# Patient Record
Sex: MALE | Race: WHITE | Employment: FULL TIME | URBAN - METROPOLITAN AREA
[De-identification: names, ages, dates, MRNs, and addresses within clinical notes are randomized per-mention and may not be internally consistent; named-entity substitution may affect disease eponyms.]

---

## 2018-02-20 ENCOUNTER — HOSPITAL ENCOUNTER (EMERGENCY)
Facility: CLINIC | Age: 48
Discharge: HOME | End: 2018-02-21
Attending: EMERGENCY MEDICINE | Admitting: EMERGENCY MEDICINE
Payer: COMMERCIAL

## 2018-02-20 ENCOUNTER — APPOINTMENT (OUTPATIENT)
Dept: GENERAL RADIOLOGY | Facility: CLINIC | Age: 48
End: 2018-02-20
Attending: EMERGENCY MEDICINE
Payer: COMMERCIAL

## 2018-02-20 DIAGNOSIS — J10.1 INFLUENZA A: ICD-10-CM

## 2018-02-20 DIAGNOSIS — I10 BENIGN ESSENTIAL HYPERTENSION: ICD-10-CM

## 2018-02-20 LAB
FLUAV+FLUBV AG SPEC QL: NEGATIVE
FLUAV+FLUBV AG SPEC QL: POSITIVE
SPECIMEN SOURCE: ABNORMAL

## 2018-02-20 PROCEDURE — 87804 INFLUENZA ASSAY W/OPTIC: CPT | Mod: 91 | Performed by: EMERGENCY MEDICINE

## 2018-02-20 PROCEDURE — 25000132 ZZH RX MED GY IP 250 OP 250 PS 637: Performed by: EMERGENCY MEDICINE

## 2018-02-20 PROCEDURE — 99284 EMERGENCY DEPT VISIT MOD MDM: CPT | Mod: 25

## 2018-02-20 PROCEDURE — 71046 X-RAY EXAM CHEST 2 VIEWS: CPT

## 2018-02-20 RX ORDER — IBUPROFEN 600 MG/1
600 TABLET, FILM COATED ORAL ONCE
Status: COMPLETED | OUTPATIENT
Start: 2018-02-20 | End: 2018-02-20

## 2018-02-20 RX ADMIN — IBUPROFEN 600 MG: 600 TABLET ORAL at 22:15

## 2018-02-20 ASSESSMENT — ENCOUNTER SYMPTOMS
COUGH: 1
DIZZINESS: 0
NAUSEA: 0
NUMBNESS: 0
CHILLS: 1
DIARRHEA: 0
SINUS PRESSURE: 1
FEVER: 0
HEADACHES: 1
VOMITING: 0

## 2018-02-20 NOTE — ED AVS SNAPSHOT
[facility] Emergency Department    [address]    [facility] [address]    Phone:  [phone]    Fax:  [phone]                                       Mendoza Meyer   MRN: [id number]    Department:  [facility] Emergency Department   Date of Visit:  2/20/2018           After Visit Summary Signature Page     I have received my discharge instructions, and my questions have been answered. I have discussed any challenges I see with this plan with the nurse or doctor.    ..........................................................................................................................................  Patient/Patient Representative Signature      ..........................................................................................................................................  Patient Representative Print Name and Relationship to Patient    ..................................................               ................................................  Date                                            Time    ..........................................................................................................................................  Reviewed by Signature/Title    ...................................................              ..............................................  Date                                                            Time

## 2018-02-20 NOTE — LETTER
February 21, 2018      To Whom It May Concern:      Mendoza Meyer was seen in our Emergency Department today, 02/21/18.  I expect his condition to improve over the next 3 days.  He may return to work/school on 2/25/18.    Sincerely,        Comfort Estrada RN

## 2018-02-20 NOTE — ED AVS SNAPSHOT
[facility] Emergency Department    [address] [place] [address]    Phone:  [phone]    Fax:  [phone]                                       Mendoza Meyer   MRN: [id number]    Department:  [facility] Emergency Department   Date of Visit:  2/20/2018           Patient Information     Date Of Birth          1970        Your diagnoses for this visit were:     Influenza A     Benign essential hypertension        You were seen by Erin Livingston MD.      Follow-up Information     Follow up with [facility], [place]. Schedule an appointment as soon as possible for a visit in 3 days.    Specialty:  Family Practice        Discharge Instructions       Get extra rest and drink extra clear liquids. You may take acetaminophen or ibuprofen according to package directions, with food, for fevers/aches.     See your primary care clinic for recheck in 3-5 days if symptoms persist and for recheck of your blood pressure, or sooner if symptoms are not improving.    If you have any worsening/severe symptoms seek medical care right away.         The Flu (Influenza)     The virus that causes the flu spreads through the air in droplets when someone who has the flu coughs, sneezes, laughs, or talks.   The flu (influenza) is an infection that affects your respiratory tract. This tract is made up of your mouth, nose, and lungs, and the passages between them. Unlike a cold, the flu can make you very ill. And it can lead to pneumonia, a serious lung infection. The flu can have serious complications and even cause death.  Who is at risk for the flu?  Anyone can get the flu. But you are more likely to become infected if you:    Have a weakened immune system    Work in a healthcare setting where you may be exposed to flu germs    Live or work with someone who has the flu    Haven t had an annual flu shot  How does the flu spread?  The flu is caused by a virus. The virus spreads through  the air in droplets when someone who has the flu coughs, sneezes, laughs, or talks. You can become infected when you inhale these viruses directly. You can also become infected when you touch a surface on which the droplets have landed and then transfer the germs to your eyes, nose, or mouth. Touching used tissues, or sharing utensils, drinking glasses, or a toothbrush from an infected person can expose you to flu viruses, too.  What are the symptoms of the flu?  Flu symptoms tend to come on quickly and may last a few days to a few weeks. They include:    Fever usually higher than 100.4 F  (38 C) and chills    Sore throat and headache    Dry cough    Runny nose    Tiredness and weakness    Muscle aches  Who is at risk for flu complications?  For some people, the flu can be very serious. The risk for complications is greater for:    Children younger than age 5    Adults ages 65 and older    People with a chronic illness such as diabetes or heart, kidney, or lung disease    People who live in a nursing home or long-term care facility   How is the flu treated?  The flu usually gets better after 7 days or so. In some cases, your healthcare provider may prescribe an antiviral medicine. This may help you get well a little sooner. For the medicine to help, you need to take it as soon as possible (ideally within 48 hours) after your symptoms start. If you develop pneumonia or other serious illness, you may need to stay in the hospital.  Easing flu symptoms    Drink lots of fluids such as water, juice, and warm soup. A good rule is to drink enough so that you urinate your normal amount.    Get plenty of rest.    Ask your healthcare provider what to take for fever and pain.    Call your provider if your fever is 100.4 F (38 C) or higher, or you become dizzy, lightheaded, or short of breath.  Taking steps to protect others    Wash your hands often, especially after coughing or sneezing. Or clean your hands with an  alcohol-based hand  containing at least 60% alcohol.    Cough or sneeze into a tissue. Then throw the tissue away and wash your hands. If you don t have a tissue, cough and sneeze into your elbow.    Stay home until at least 24 hours after you no longer have a fever or chills. Be sure the fever isn t being hidden by fever-reducing medicine.    Don t share food, utensils, drinking glasses, or a toothbrush with others.    Ask your healthcare provider if others in your household should get antiviral medicine to help them avoid infection.  How can the flu be prevented?    One of the best ways to avoid the flu is to get a flu vaccine each year. The virus that causes the flu changes from year to year. For that reason, healthcare providers recommend getting the flu vaccine each year, as soon as it's available in your area. The vaccine is given as a shot. Your healthcare provider can tell you which vaccine is right for you. A nasal spray is also available but is not recommended for the 9375-0960 flu season. The CDC says this is because the nasal spray did not seem to protect against the flu over the last several flu seasons. In the past, it was meant for people ages 2 to 49.    Wash your hands often. Frequent handwashing is a proven way to help prevent infection.    Carry an alcohol-based hand gel containing at least 60% alcohol. Use it when you can't use soap and water. Then wash your hands as soon as you can.    Avoid touching your eyes, nose, and mouth.    At home and work, clean phones, computer keyboards, and toys often with disinfectant wipes.    If possible, avoid close contact with others who have the flu or symptoms of the flu.  Handwashing tips  Handwashing is one of the best ways to prevent many common infections. If you are caring for or visiting someone with the flu, wash your hands each time you enter and leave the room. Follow these steps:    Use warm water and plenty of soap. Rub your hands together  well.    Clean the whole hand, including under your nails, between your fingers, and up the wrists.    Wash for at least 15 seconds.    Rinse, letting the water run down your fingers, not up your wrists.    Dry your hands well. Use a paper towel to turn off the faucet and open the door.  Using alcohol-based hand   Alcohol-based hand  are also a good choice. Use them when you can't use soap and water. Follow these steps:    Squeeze about a tablespoon of gel into the palm of one hand.    Rub your hands together briskly, cleaning the backs of your hands, the palms, between your fingers, and up the wrists.    Rub until the gel is gone and your hands are completely dry.  Preventing the flu in healthcare settings  The flu is a special concern for people in hospitals and long-term care facilities. To help prevent the spread of flu, many hospitals and nursing homes take these steps:    Healthcare providers wash their hands or use an alcohol-based hand  before and after treating each patient.    People with the flu have private rooms and bathrooms or share a room with someone with the same infection.    People who are at high risk for the flu but don't have it are encouraged to get the flu and pneumonia vaccines.    All healthcare workers are encouraged or required to get flu shots.   Date Last Reviewed: 12/1/2016 2000-2017 [employer]. [address] [place] [address]. All rights reserved. This information is not intended as a substitute for professional medical care. Always follow your healthcare professional's instructions.        Established High Blood Pressure    High blood pressure (hypertension) is a chronic disease. Often, healthcare providers don t know what causes it. But it can be caused by certain health conditions and medicines.  If you have high blood pressure, you may not have any symptoms. If you do have symptoms, they may include headache, dizziness, changes  in your vision, chest pain, and shortness of breath. But even without symptoms, high blood pressure that s not treated raises your risk for heart attack and stroke. High blood pressure is a serious health risk and shouldn t be ignored.  A blood pressure reading is made up of two numbers: a higher number over a lower number. The top number is the systolic pressure. The bottom number is the diastolic pressure. A normal blood pressure is a systolic pressure of  less than 120 over a diastolic pressure of less than 80. You will see your blood pressure readings written together. For example, a person with a systolic pressure of 188 and a diastolic pressure of 78 will have 118/78 written in the medical record.  High blood pressure is when either the top number is 140 or higher, or the bottom number is 90 or higher. This must be the result when taking your blood pressure a number of times. The blood pressures between normal and high are called prehypertension.  Home care  If you have high blood pressure, you should do what is listed below to lower your blood pressure. If you are taking medicines for high blood pressure, these methods may reduce or end your need for medicines in the future.  Begin a weight-loss program if you are overweight.  Cut back on how much salt you get in your diet. Here s how to do this:  Don t eat foods that have a lot of salt. These include olives, pickles, smoked meats, and salted potato chips.  Don t add salt to your food at the table.  Use only small amounts of salt when cooking.  Start an exercise program. Talk with your healthcare provider about the type of exercise program that would be best for you. It doesn't have to be hard. Even brisk walking for 20 minutes 3 times a week is a good form of exercise.  Don t take medicines that stimulate the heart. This includes many over-the-counter cold and sinus decongestant pills and sprays, as well as diet pills. Check the warnings about hypertension  on the label. Before buying any over-the-counter medicines or supplements, always ask the pharmacist about the product's potential interaction with your high blood pressure and your high blood pressure medicines.  Stimulants such as amphetamine or cocaine could be deadly for someone with high blood pressure. Never take these.  Limit how much caffeine you get in your diet. Switch to caffeine-free products.  Stop smoking. If you are a long-time smoker, this can be hard. Talk to your healthcare provider about medicines and nicotine replacement options to help you. Also, enroll in a stop-smoking program to make it more likely that you will quit for good.  Learn how to handle stress. This is an important part of any program to lower blood pressure. Learn about relaxation methods like meditation, yoga, or biofeedback.  If your provider prescribed medicines, take them exactly as directed. Missing doses may cause your blood pressure get out of control.  If you miss a dose or doses, check with your healthcare provider or pharmacist about what to do.  Consider buying an automatic blood pressure machine. Ask your provider for a recommendation. You can get one of these at most pharmacies.     The American Heart Association recommends the following guidelines for home blood pressure monitoring:  Don't smoke or drink coffee for 30 minutes before taking your blood pressure.  Go to the bathroom before the test.  Relax for 5 minutes before taking the measurement.  Sit with your back supported (don't sit on a couch or soft chair); keep your feet on the floor uncrossed. Place your arm on a solid flat surface (like a table) with the upper part of the arm at heart level. Place the middle of the cuff directly above the eye of the elbow. Check the monitor's instruction manual for an illustration.  Take multiple readings. When you measure, take 2 to 3 readings one minute apart and record all of the results.  Take your blood pressure at the  same time every day, or as your healthcare provider recommends.  Record the date, time, and blood pressure reading.  Take the record with you to your next medical appointment. If your blood pressure monitor has a built-in memory, simply take the monitor with you to your next appointment.  Call your provider if you have several high readings. Don't be frightened by a single high blood pressure reading, but if you get several high readings, check in with your healthcare provider.  Note: When blood pressure reaches a systolic (top number) of 180 or higher OR diastolic (bottom number) of 110 or higher, seek emergency medical treatment.  Follow-up care  You will need to see your healthcare provider regularly. This is to check your blood pressure and to make changes to your medicines. Make a follow-up appointment as directed. Bring the record of your home blood pressure readings to the appointment.  When to seek medical advice  Call your healthcare provider right away if any of these occur:  Blood pressure reaches a systolic (upper number) of 180 or higher OR a diastolic (bottom number) of 110 or higher  Chest pain or shortness of breath  Severe headache  Throbbing or rushing sound in the ears  Nosebleed  Sudden severe pain in your belly (abdomen)  Extreme drowsiness, confusion, or fainting  Dizziness or spinning sensation (vertigo)  Weakness of an arm or leg or one side of the face  You have problems speaking or seeing   Date Last Reviewed: 12/1/2016 2000-2017 [employer]. [address] [place] [address]. All rights reserved. This information is not intended as a substitute for professional medical care. Always follow your healthcare professional's instructions.          24 Hour Appointment Hotline       To make an appointment at any [facility] call [phone] ([phone]). If you don't have a family doctor or clinic, we will help you find one. [facility] are conveniently  located to serve the needs of you and your family.             Review of your medicines      Our records show that you are taking the medicines listed below. If these are incorrect, please call your family doctor or clinic.        Dose / Directions Last dose taken    BENAZEPRIL HCL PO        Refills:  0                Procedures and tests performed during your visit     Influenza A/B antigen    XR Chest 2 Views      Orders Needing Specimen Collection     None      Pending Results     No orders found for last 3 day(s).            Pending Culture Results     No orders found for last 3 day(s).            Pending Results Instructions     If you had any lab results that were not finalized at the time of your Discharge, you can call the ED Lab Result RN at [phone]. You will be contacted by this team for any positive Lab results or changes in treatment. The nurses are available 7 days a week from 10A to 6:30P.  You can leave a message 24 hours per day and they will return your call.        Test Results From Your Hospital Stay        2/20/2018  9:15 PM      Component Results     Component Value Ref Range & Units Status    Influenza A/B Agn Specimen Nasal  Final    Influenza A Positive (A) NEG^Negative Final    Influenza B Negative NEG^Negative Final    Test results must be correlated with clinical data. If necessary, results   should be confirmed by a molecular assay or viral culture.           2/20/2018 10:42 PM      Narrative     XR CHEST 2 VW 2/20/2018 10:32 PM    COMPARISON: None.    HISTORY: Cough.        Impression     IMPRESSION: Cardiac silhouette and pulmonary vasculature are within  normal limits. No focal airspace disease, pleural effusion or  pneumothorax.    TARYN SHANKAR MD                Clinical Quality Measure: Blood Pressure Screening     Your blood pressure was checked while you were in the emergency department today. The last reading we obtained was  BP: 160/90 . Please read the guidelines below  "about what these numbers mean and what you should do about them.  If your systolic blood pressure (the top number) is less than 120 and your diastolic blood pressure (the bottom number) is less than 80, then your blood pressure is normal. There is nothing more that you need to do about it.  If your systolic blood pressure (the top number) is 120-139 or your diastolic blood pressure (the bottom number) is 80-89, your blood pressure may be higher than it should be. You should have your blood pressure rechecked within a year by a primary care provider.  If your systolic blood pressure (the top number) is 140 or greater or your diastolic blood pressure (the bottom number) is 90 or greater, you may have high blood pressure. High blood pressure is treatable, but if left untreated over time it can put you at risk for heart attack, stroke, or kidney failure. You should have your blood pressure rechecked by a primary care provider within the next 4 weeks.  If your provider in the emergency department today gave you specific instructions to follow-up with your doctor or provider even sooner than that, you should follow that instruction and not wait for up to 4 weeks for your follow-up visit.        Thank you for choosing [facility]       Thank you for choosing [facility] for your care. Our goal is always to provide you with excellent care. Hearing back from our patients is one way we can continue to improve our services. Please take a few minutes to complete the written survey that you may receive in the mail after you visit with us. Thank you!        [employer] Information     [employer] lets you send messages to your doctor, view your test results, renew your prescriptions, schedule appointments and more. To sign up, go to [email or web address] . Click on \"Log in\" on the left side of the screen, which will take you to the Welcome page. Then click on \"Sign up Now\" on the right side of the page.     You will be asked to enter the " access code listed below, as well as some personal information. Please follow the directions to create your username and password.     Your access code is: [email or web address]  Expires: 2018 12:19 AM     Your access code will  in 90 days. If you need help or a new code, please call your [place] clinic or [phone].        Care EveryWhere ID     This is your Care EveryWhere ID. This could be used by other organizations to access your [facility] medical records  [id number]        Equal Access to Services     [facility] : [place] winters Sodave, waaxlesley luqadaha, qaybonel kaalmalesley lombardi, silverio junior . So [facility] [phone].    ATENCIÓN: Si habla español, tiene a robles disposición servicios gratuitos de asistencia lingüística. Llame al [phone].    We comply with applicable federal civil rights laws and [place] laws. We do not discriminate on the basis of race, color, national origin, age, disability, sex, sexual orientation, or gender identity.            After Visit Summary       This is your record. Keep this with you and show to your community pharmacist(s) and doctor(s) at your next visit.

## 2018-02-21 VITALS
SYSTOLIC BLOOD PRESSURE: 160 MMHG | WEIGHT: 285 LBS | TEMPERATURE: 100.2 F | OXYGEN SATURATION: 98 % | RESPIRATION RATE: 18 BRPM | DIASTOLIC BLOOD PRESSURE: 90 MMHG

## 2018-02-21 NOTE — DISCHARGE INSTRUCTIONS
Get extra rest and drink extra clear liquids. You may take acetaminophen or ibuprofen according to package directions, with food, for fevers/aches.     See your primary care clinic for recheck in 3-5 days if symptoms persist and for recheck of your blood pressure, or sooner if symptoms are not improving.    If you have any worsening/severe symptoms seek medical care right away.         The Flu (Influenza)     The virus that causes the flu spreads through the air in droplets when someone who has the flu coughs, sneezes, laughs, or talks.   The flu (influenza) is an infection that affects your respiratory tract. This tract is made up of your mouth, nose, and lungs, and the passages between them. Unlike a cold, the flu can make you very ill. And it can lead to pneumonia, a serious lung infection. The flu can have serious complications and even cause death.  Who is at risk for the flu?  Anyone can get the flu. But you are more likely to become infected if you:    Have a weakened immune system    Work in a healthcare setting where you may be exposed to flu germs    Live or work with someone who has the flu    Haven t had an annual flu shot  How does the flu spread?  The flu is caused by a virus. The virus spreads through the air in droplets when someone who has the flu coughs, sneezes, laughs, or talks. You can become infected when you inhale these viruses directly. You can also become infected when you touch a surface on which the droplets have landed and then transfer the germs to your eyes, nose, or mouth. Touching used tissues, or sharing utensils, drinking glasses, or a toothbrush from an infected person can expose you to flu viruses, too.  What are the symptoms of the flu?  Flu symptoms tend to come on quickly and may last a few days to a few weeks. They include:    Fever usually higher than 100.4 F  (38 C) and chills    Sore throat and headache    Dry cough    Runny nose    Tiredness and weakness    Muscle  aches  Who is at risk for flu complications?  For some people, the flu can be very serious. The risk for complications is greater for:    Children younger than age 5    Adults ages 65 and older    People with a chronic illness such as diabetes or heart, kidney, or lung disease    People who live in a nursing home or long-term care facility   How is the flu treated?  The flu usually gets better after 7 days or so. In some cases, your healthcare provider may prescribe an antiviral medicine. This may help you get well a little sooner. For the medicine to help, you need to take it as soon as possible (ideally within 48 hours) after your symptoms start. If you develop pneumonia or other serious illness, you may need to stay in the hospital.  Easing flu symptoms    Drink lots of fluids such as water, juice, and warm soup. A good rule is to drink enough so that you urinate your normal amount.    Get plenty of rest.    Ask your healthcare provider what to take for fever and pain.    Call your provider if your fever is 100.4 F (38 C) or higher, or you become dizzy, lightheaded, or short of breath.  Taking steps to protect others    Wash your hands often, especially after coughing or sneezing. Or clean your hands with an alcohol-based hand  containing at least 60% alcohol.    Cough or sneeze into a tissue. Then throw the tissue away and wash your hands. If you don t have a tissue, cough and sneeze into your elbow.    Stay home until at least 24 hours after you no longer have a fever or chills. Be sure the fever isn t being hidden by fever-reducing medicine.    Don t share food, utensils, drinking glasses, or a toothbrush with others.    Ask your healthcare provider if others in your household should get antiviral medicine to help them avoid infection.  How can the flu be prevented?    One of the best ways to avoid the flu is to get a flu vaccine each year. The virus that causes the flu changes from year to year. For  that reason, healthcare providers recommend getting the flu vaccine each year, as soon as it's available in your area. The vaccine is given as a shot. Your healthcare provider can tell you which vaccine is right for you. A nasal spray is also available but is not recommended for the 2164-8893 flu season. The CDC says this is because the nasal spray did not seem to protect against the flu over the last several flu seasons. In the past, it was meant for people ages 2 to 49.    Wash your hands often. Frequent handwashing is a proven way to help prevent infection.    Carry an alcohol-based hand gel containing at least 60% alcohol. Use it when you can't use soap and water. Then wash your hands as soon as you can.    Avoid touching your eyes, nose, and mouth.    At home and work, clean phones, computer keyboards, and toys often with disinfectant wipes.    If possible, avoid close contact with others who have the flu or symptoms of the flu.  Handwashing tips  Handwashing is one of the best ways to prevent many common infections. If you are caring for or visiting someone with the flu, wash your hands each time you enter and leave the room. Follow these steps:    Use warm water and plenty of soap. Rub your hands together well.    Clean the whole hand, including under your nails, between your fingers, and up the wrists.    Wash for at least 15 seconds.    Rinse, letting the water run down your fingers, not up your wrists.    Dry your hands well. Use a paper towel to turn off the faucet and open the door.  Using alcohol-based hand   Alcohol-based hand  are also a good choice. Use them when you can't use soap and water. Follow these steps:    Squeeze about a tablespoon of gel into the palm of one hand.    Rub your hands together briskly, cleaning the backs of your hands, the palms, between your fingers, and up the wrists.    Rub until the gel is gone and your hands are completely dry.  Preventing the flu in  healthcare settings  The flu is a special concern for people in hospitals and long-term care facilities. To help prevent the spread of flu, many hospitals and nursing homes take these steps:    Healthcare providers wash their hands or use an alcohol-based hand  before and after treating each patient.    People with the flu have private rooms and bathrooms or share a room with someone with the same infection.    People who are at high risk for the flu but don't have it are encouraged to get the flu and pneumonia vaccines.    All healthcare workers are encouraged or required to get flu shots.   Date Last Reviewed: 12/1/2016 2000-2017 [employer]. [address] [place] [address], [place] [address]. All rights reserved. This information is not intended as a substitute for professional medical care. Always follow your healthcare professional's instructions.        Established High Blood Pressure    High blood pressure (hypertension) is a chronic disease. Often, healthcare providers don t know what causes it. But it can be caused by certain health conditions and medicines.  If you have high blood pressure, you may not have any symptoms. If you do have symptoms, they may include headache, dizziness, changes in your vision, chest pain, and shortness of breath. But even without symptoms, high blood pressure that s not treated raises your risk for heart attack and stroke. High blood pressure is a serious health risk and shouldn t be ignored.  A blood pressure reading is made up of two numbers: a higher number over a lower number. The top number is the systolic pressure. The bottom number is the diastolic pressure. A normal blood pressure is a systolic pressure of  less than 120 over a diastolic pressure of less than 80. You will see your blood pressure readings written together. For example, a person with a systolic pressure of 188 and a diastolic pressure of 78 will have 118/78 written in the medical  record.  High blood pressure is when either the top number is 140 or higher, or the bottom number is 90 or higher. This must be the result when taking your blood pressure a number of times. The blood pressures between normal and high are called prehypertension.  Home care  If you have high blood pressure, you should do what is listed below to lower your blood pressure. If you are taking medicines for high blood pressure, these methods may reduce or end your need for medicines in the future.  Begin a weight-loss program if you are overweight.  Cut back on how much salt you get in your diet. Here s how to do this:  Don t eat foods that have a lot of salt. These include olives, pickles, smoked meats, and salted potato chips.  Don t add salt to your food at the table.  Use only small amounts of salt when cooking.  Start an exercise program. Talk with your healthcare provider about the type of exercise program that would be best for you. It doesn't have to be hard. Even brisk walking for 20 minutes 3 times a week is a good form of exercise.  Don t take medicines that stimulate the heart. This includes many over-the-counter cold and sinus decongestant pills and sprays, as well as diet pills. Check the warnings about hypertension on the label. Before buying any over-the-counter medicines or supplements, always ask the pharmacist about the product's potential interaction with your high blood pressure and your high blood pressure medicines.  Stimulants such as amphetamine or cocaine could be deadly for someone with high blood pressure. Never take these.  Limit how much caffeine you get in your diet. Switch to caffeine-free products.  Stop smoking. If you are a long-time smoker, this can be hard. Talk to your healthcare provider about medicines and nicotine replacement options to help you. Also, enroll in a stop-smoking program to make it more likely that you will quit for good.  Learn how to handle stress. This is an  important part of any program to lower blood pressure. Learn about relaxation methods like meditation, yoga, or biofeedback.  If your provider prescribed medicines, take them exactly as directed. Missing doses may cause your blood pressure get out of control.  If you miss a dose or doses, check with your healthcare provider or pharmacist about what to do.  Consider buying an automatic blood pressure machine. Ask your provider for a recommendation. You can get one of these at most pharmacies.     The American Heart Association recommends the following guidelines for home blood pressure monitoring:  Don't smoke or drink coffee for 30 minutes before taking your blood pressure.  Go to the bathroom before the test.  Relax for 5 minutes before taking the measurement.  Sit with your back supported (don't sit on a couch or soft chair); keep your feet on the floor uncrossed. Place your arm on a solid flat surface (like a table) with the upper part of the arm at heart level. Place the middle of the cuff directly above the eye of the elbow. Check the monitor's instruction manual for an illustration.  Take multiple readings. When you measure, take 2 to 3 readings one minute apart and record all of the results.  Take your blood pressure at the same time every day, or as your healthcare provider recommends.  Record the date, time, and blood pressure reading.  Take the record with you to your next medical appointment. If your blood pressure monitor has a built-in memory, simply take the monitor with you to your next appointment.  Call your provider if you have several high readings. Don't be frightened by a single high blood pressure reading, but if you get several high readings, check in with your healthcare provider.  Note: When blood pressure reaches a systolic (top number) of 180 or higher OR diastolic (bottom number) of 110 or higher, seek emergency medical treatment.  Follow-up care  You will need to see your healthcare  provider regularly. This is to check your blood pressure and to make changes to your medicines. Make a follow-up appointment as directed. Bring the record of your home blood pressure readings to the appointment.  When to seek medical advice  Call your healthcare provider right away if any of these occur:  Blood pressure reaches a systolic (upper number) of 180 or higher OR a diastolic (bottom number) of 110 or higher  Chest pain or shortness of breath  Severe headache  Throbbing or rushing sound in the ears  Nosebleed  Sudden severe pain in your belly (abdomen)  Extreme drowsiness, confusion, or fainting  Dizziness or spinning sensation (vertigo)  Weakness of an arm or leg or one side of the face  You have problems speaking or seeing   Date Last Reviewed: 12/1/2016 2000-2017 [employer]. [address] [place] [address], [place] [address]. All rights reserved. This information is not intended as a substitute for professional medical care. Always follow your healthcare professional's instructions.

## 2018-02-21 NOTE — ED PROVIDER NOTES
History     Chief Complaint:  Flu Symptoms and Hypertension    HPI   Mendoza Meyer is a 47 year old male with a history of hypertension who presents with flu symptoms and a headache. The patient reports developing nasal congestion, cough, and chills without fever 4 days ago. He began to feel better the next day until around 1600 today when he developed worsening cough, congestion, mild nasal congestion, and chills. He took Mucinex night time for his symptoms (containing phenylephrine) and developed a headache with sinus pressure. He checked his blood pressure at home and found it to be 173/112, so he presents for further evaluation and treatment. He notes no numbness or vision changes.  He denies nausea, vomiting, diarrhea, or dizziness. He endorses drinking fluids in excess recently. Of note, the patient reports taking his Benazepril as prescribed and his pressures have been running around 125/75 at baseline.     Allergies:  No known drug allergies      Medications:    Benazepril     Past Medical History:    Hypertension    Past Surgical History:    Orthopedic surgery    Family History:    History review. No contributing family history.     Social History:  Smoking status: No  Alcohol use: yes   PCP: No primary care provider on file.   Patient presents alone.   Marital Status:       Review of Systems   Constitutional: Positive for chills. Negative for fever.   HENT: Positive for congestion, postnasal drip and sinus pressure.    Eyes: Negative for visual disturbance.   Respiratory: Positive for cough.    Gastrointestinal: Negative for diarrhea, nausea and vomiting.   Neurological: Positive for headaches. Negative for dizziness and numbness.   All other systems reviewed and are negative.    Physical Exam     Patient Vitals for the past 24 hrs:   BP Temp Temp src Heart Rate Resp SpO2 Weight   02/20/18 2343 160/90 - - - - - -   02/20/18 2317 (!) 163/98 - - - - - -   02/20/18 2032 (!) 168/104 100.2  F (37.9   C) Oral 107 18 98 % 129.3 kg (285 lb)     Physical Exam  Constitutional:  Well developed, Well nourished, completely comfortable appearing   HENT:  Bilateral external ears normal, Mucous membranes moist, Nose normal. Neck- Normal range of motion, Supple. Oropharynx clear, TMs unremarkable, sinuses non tender  Respiratory:  Normal breath sounds, No respiratory distress, No wheezing,  Cardiovascular:  Normal heart rate, Normal rhythm, No murmurs,    GI:  Bowel sounds normal, Soft, No tenderness,   Musculoskeletal:  Intact distal pulses, No edema, grossly unremarkable range of motion   Integument:  Warm, Dry   Neurologic:  Alert, attentive and appropriately oriented  Psychiatric:  Mildly anxious    Emergency Department Course     Imaging:  Radiographic findings were communicated with the patient who voiced understanding of the findings.    X-ray Chest, 2 views:  Cardiac silhouette and pulmonary vasculature are within  normal limits. No focal airspace disease, pleural effusion or  pneumothorax.   As read by Radiology.     Laboratory:  Influenza: Positive A    Interventions:  2215: Ibuprofen 600 mg PO    Emergency Department Course:  Past medical records, nursing notes, and vitals reviewed.  2159: I performed an exam of the patient and obtained history, as documented above. GCS 15.  The patient was taken for xray, see imaging results above.    0014: I rechecked the patient. Patient is feeling improved and reports cleat resolution of his headache. Findings and plan explained to the Patient. Patient discharged home with instructions regarding supportive care, medications, and reasons to return. The importance of close follow-up was reviewed.      Impression & Plan      Medical Decision Making:  Mendoza Meyer is a 47 year old male who presents for evaluation of cough, fever and myalgias.  They have other symptoms including headache and chills.   This is consistent with influenza and patient was influenza positive with  testing done at triage.   The patient is out of treatment window for occasion and is not in a high risk population likely to benefit from medications.  They are at risk for pneumonia, and given his description of initial improving and now worsening symptoms with a low-grade fever here but this did not show any evidence of pneumonia, chest x-ray was done, .  Close followup of primary care physician is indicated and return to the ED for high fevers > 103 for more than 48 hours more, increasing productive cough, shortness of breath, or confusion.  There is no signs of serious bacterial infection such as bacteremia, meningitis, UTI/pyelonephritis, strep pharyngitis, etc. blood pressure was elevated as noted, in the context of headache pain and fever.  Blood pressure improved without intervention with treatment of his fever.  It was higher than baseline and we discussed blood pressure monitoring and close follow-up for recheck of this and his other symptoms.  He is completely comfortable with plan.    Diagnosis:    ICD-10-CM    1. Influenza A J10.1    2. Benign essential hypertension I10        Disposition:  discharged to home    Maritza Corral  2/20/2018   [facility] EMERGENCY DEPARTMENT  I, Maritza Corral, harish serving as a scribe at 9:59 PM on 2/20/2018 to document services personally performed by Erin Livingston MD based on my observations and the provider's statements to me.        Erin Livingston MD  02/21/18 [id number]

## 2018-02-21 NOTE — ED NOTES
"Patient presents to ED due flu like symptoms and hypertension     States HA, generalized body aches , sinus pressure developed a \"few days ago \" worse today     Hx high blood pressure took     mucinex 1900     BP at home 171/ 112    Denies any blurry vision, dizziness or HA at this time  ABC intact   A/o x4  "